# Patient Record
Sex: FEMALE | Race: BLACK OR AFRICAN AMERICAN | NOT HISPANIC OR LATINO | Employment: UNEMPLOYED | URBAN - METROPOLITAN AREA
[De-identification: names, ages, dates, MRNs, and addresses within clinical notes are randomized per-mention and may not be internally consistent; named-entity substitution may affect disease eponyms.]

---

## 2021-03-03 ENCOUNTER — HOSPITAL ENCOUNTER (EMERGENCY)
Facility: HOSPITAL | Age: 36
Discharge: HOME | End: 2021-03-04
Attending: EMERGENCY MEDICINE | Admitting: EMERGENCY MEDICINE

## 2021-03-03 ENCOUNTER — APPOINTMENT (OUTPATIENT)
Dept: GENERAL RADIOLOGY | Facility: HOSPITAL | Age: 36
End: 2021-03-03

## 2021-03-03 DIAGNOSIS — Z86.39 HISTORY OF MORBID OBESITY: ICD-10-CM

## 2021-03-03 DIAGNOSIS — E66.01 MORBID OBESITY (HCC): ICD-10-CM

## 2021-03-03 DIAGNOSIS — I50.43 ACUTE ON CHRONIC COMBINED SYSTOLIC AND DIASTOLIC CONGESTIVE HEART FAILURE (HCC): Primary | ICD-10-CM

## 2021-03-03 PROCEDURE — 71045 X-RAY EXAM CHEST 1 VIEW: CPT

## 2021-03-03 PROCEDURE — 93005 ELECTROCARDIOGRAM TRACING: CPT | Performed by: EMERGENCY MEDICINE

## 2021-03-03 PROCEDURE — 99284 EMERGENCY DEPT VISIT MOD MDM: CPT

## 2021-03-03 RX ORDER — SODIUM CHLORIDE 0.9 % (FLUSH) 0.9 %
10 SYRINGE (ML) INJECTION AS NEEDED
Status: DISCONTINUED | OUTPATIENT
Start: 2021-03-03 | End: 2021-03-04 | Stop reason: HOSPADM

## 2021-03-04 ENCOUNTER — APPOINTMENT (OUTPATIENT)
Dept: CT IMAGING | Facility: HOSPITAL | Age: 36
End: 2021-03-04

## 2021-03-04 VITALS
HEIGHT: 66 IN | RESPIRATION RATE: 18 BRPM | HEART RATE: 73 BPM | SYSTOLIC BLOOD PRESSURE: 177 MMHG | BODY MASS INDEX: 47.09 KG/M2 | OXYGEN SATURATION: 96 % | DIASTOLIC BLOOD PRESSURE: 108 MMHG | WEIGHT: 293 LBS | TEMPERATURE: 98.3 F

## 2021-03-04 LAB
ALBUMIN SERPL-MCNC: 3.8 G/DL (ref 3.5–5.2)
ALBUMIN/GLOB SERPL: 1 G/DL
ALP SERPL-CCNC: 56 U/L (ref 39–117)
ALT SERPL W P-5'-P-CCNC: 11 U/L (ref 1–33)
ANION GAP SERPL CALCULATED.3IONS-SCNC: 7.7 MMOL/L (ref 5–15)
AST SERPL-CCNC: 12 U/L (ref 1–32)
BILIRUB SERPL-MCNC: 0.4 MG/DL (ref 0–1.2)
BUN SERPL-MCNC: 7 MG/DL (ref 6–20)
BUN/CREAT SERPL: 8.8 (ref 7–25)
CALCIUM SPEC-SCNC: 9.3 MG/DL (ref 8.6–10.5)
CHLORIDE SERPL-SCNC: 100 MMOL/L (ref 98–107)
CO2 SERPL-SCNC: 31.3 MMOL/L (ref 22–29)
CREAT SERPL-MCNC: 0.8 MG/DL (ref 0.57–1)
DEPRECATED RDW RBC AUTO: 44.2 FL (ref 37–54)
EOSINOPHIL # BLD MANUAL: 0.09 10*3/MM3 (ref 0–0.4)
EOSINOPHIL NFR BLD MANUAL: 1 % (ref 0.3–6.2)
ERYTHROCYTE [DISTWIDTH] IN BLOOD BY AUTOMATED COUNT: 17.1 % (ref 12.3–15.4)
GFR SERPL CREATININE-BSD FRML MDRD: 98 ML/MIN/1.73
GLOBULIN UR ELPH-MCNC: 4 GM/DL
GLUCOSE SERPL-MCNC: 151 MG/DL (ref 65–99)
HCG SERPL QL: NEGATIVE
HCT VFR BLD AUTO: 35.9 % (ref 34–46.6)
HGB BLD-MCNC: 11.3 G/DL (ref 12–15.9)
LYMPHOCYTES # BLD MANUAL: 3.04 10*3/MM3 (ref 0.7–3.1)
LYMPHOCYTES NFR BLD MANUAL: 2 % (ref 5–12)
LYMPHOCYTES NFR BLD MANUAL: 33.7 % (ref 19.6–45.3)
MCH RBC QN AUTO: 23 PG (ref 26.6–33)
MCHC RBC AUTO-ENTMCNC: 31.5 G/DL (ref 31.5–35.7)
MCV RBC AUTO: 73.1 FL (ref 79–97)
MICROCYTES BLD QL: ABNORMAL
MONOCYTES # BLD AUTO: 0.18 10*3/MM3 (ref 0.1–0.9)
NEUTROPHILS # BLD AUTO: 5.7 10*3/MM3 (ref 1.7–7)
NEUTROPHILS NFR BLD MANUAL: 63.3 % (ref 42.7–76)
NRBC BLD AUTO-RTO: 0 /100 WBC (ref 0–0.2)
NT-PROBNP SERPL-MCNC: 302.7 PG/ML (ref 0–450)
PLAT MORPH BLD: NORMAL
PLATELET # BLD AUTO: 229 10*3/MM3 (ref 140–450)
PMV BLD AUTO: 10.5 FL (ref 6–12)
POTASSIUM SERPL-SCNC: 4 MMOL/L (ref 3.5–5.2)
PROT SERPL-MCNC: 7.8 G/DL (ref 6–8.5)
QT INTERVAL: 382 MS
RBC # BLD AUTO: 4.91 10*6/MM3 (ref 3.77–5.28)
SODIUM SERPL-SCNC: 139 MMOL/L (ref 136–145)
TROPONIN T SERPL-MCNC: <0.01 NG/ML (ref 0–0.03)
WBC # BLD AUTO: 9.01 10*3/MM3 (ref 3.4–10.8)
WBC MORPH BLD: NORMAL

## 2021-03-04 PROCEDURE — 85025 COMPLETE CBC W/AUTO DIFF WBC: CPT | Performed by: EMERGENCY MEDICINE

## 2021-03-04 PROCEDURE — 96374 THER/PROPH/DIAG INJ IV PUSH: CPT

## 2021-03-04 PROCEDURE — 84484 ASSAY OF TROPONIN QUANT: CPT | Performed by: EMERGENCY MEDICINE

## 2021-03-04 PROCEDURE — 96375 TX/PRO/DX INJ NEW DRUG ADDON: CPT

## 2021-03-04 PROCEDURE — 84703 CHORIONIC GONADOTROPIN ASSAY: CPT | Performed by: EMERGENCY MEDICINE

## 2021-03-04 PROCEDURE — 25010000002 FUROSEMIDE PER 20 MG: Performed by: EMERGENCY MEDICINE

## 2021-03-04 PROCEDURE — 83880 ASSAY OF NATRIURETIC PEPTIDE: CPT | Performed by: EMERGENCY MEDICINE

## 2021-03-04 PROCEDURE — 93005 ELECTROCARDIOGRAM TRACING: CPT | Performed by: EMERGENCY MEDICINE

## 2021-03-04 PROCEDURE — 0 IOPAMIDOL PER 1 ML: Performed by: EMERGENCY MEDICINE

## 2021-03-04 PROCEDURE — 80053 COMPREHEN METABOLIC PANEL: CPT | Performed by: EMERGENCY MEDICINE

## 2021-03-04 PROCEDURE — 93010 ELECTROCARDIOGRAM REPORT: CPT | Performed by: INTERNAL MEDICINE

## 2021-03-04 PROCEDURE — 85007 BL SMEAR W/DIFF WBC COUNT: CPT | Performed by: EMERGENCY MEDICINE

## 2021-03-04 PROCEDURE — 71275 CT ANGIOGRAPHY CHEST: CPT

## 2021-03-04 RX ORDER — FUROSEMIDE 10 MG/ML
40 INJECTION INTRAMUSCULAR; INTRAVENOUS ONCE
Status: COMPLETED | OUTPATIENT
Start: 2021-03-04 | End: 2021-03-04

## 2021-03-04 RX ORDER — CLONIDINE HYDROCHLORIDE 0.1 MG/1
0.1 TABLET ORAL ONCE
Status: COMPLETED | OUTPATIENT
Start: 2021-03-04 | End: 2021-03-04

## 2021-03-04 RX ORDER — AZELASTINE 1 MG/ML
2 SPRAY, METERED NASAL 2 TIMES DAILY
COMMUNITY
Start: 2020-10-12

## 2021-03-04 RX ORDER — EPINEPHRINE 0.3 MG/.3ML
0.3 INJECTION SUBCUTANEOUS ONCE AS NEEDED
COMMUNITY
Start: 2020-11-09

## 2021-03-04 RX ORDER — ISOSORBIDE DINITRATE 10 MG/1
10 TABLET ORAL 3 TIMES DAILY
COMMUNITY
Start: 2021-01-06 | End: 2021-06-30 | Stop reason: HOSPADM

## 2021-03-04 RX ORDER — HYDRALAZINE HYDROCHLORIDE 25 MG/1
25 TABLET, FILM COATED ORAL 3 TIMES DAILY
Qty: 90 TABLET | Refills: 0 | Status: SHIPPED | OUTPATIENT
Start: 2021-03-04 | End: 2021-06-27

## 2021-03-04 RX ORDER — LISINOPRIL 40 MG/1
40 TABLET ORAL DAILY
COMMUNITY

## 2021-03-04 RX ORDER — GABAPENTIN 300 MG/1
300 CAPSULE ORAL 3 TIMES DAILY
COMMUNITY
Start: 2020-11-25

## 2021-03-04 RX ORDER — FUROSEMIDE 80 MG
80 TABLET ORAL DAILY
COMMUNITY
Start: 2020-12-04

## 2021-03-04 RX ORDER — SPIRONOLACTONE 25 MG/1
25 TABLET ORAL DAILY
COMMUNITY
Start: 2021-01-06

## 2021-03-04 RX ORDER — METOLAZONE 2.5 MG/1
2.5 TABLET ORAL
COMMUNITY
Start: 2021-01-06 | End: 2021-03-04 | Stop reason: SDUPTHER

## 2021-03-04 RX ORDER — HYDROCODONE BITARTRATE AND ACETAMINOPHEN 7.5; 325 MG/1; MG/1
1 TABLET ORAL EVERY 8 HOURS PRN
COMMUNITY

## 2021-03-04 RX ORDER — CYCLOBENZAPRINE HCL 5 MG
5 TABLET ORAL 3 TIMES DAILY PRN
COMMUNITY
Start: 2020-11-12 | End: 2021-06-27

## 2021-03-04 RX ORDER — CARVEDILOL 25 MG/1
25 TABLET ORAL 2 TIMES DAILY WITH MEALS
COMMUNITY

## 2021-03-04 RX ORDER — METHYLPREDNISOLONE 4 MG/1
4 TABLET ORAL WEEKLY
COMMUNITY
Start: 2020-11-12 | End: 2021-06-27

## 2021-03-04 RX ORDER — METOLAZONE 2.5 MG/1
5 TABLET ORAL DAILY
Qty: 30 TABLET | Refills: 0 | Status: SHIPPED | OUTPATIENT
Start: 2021-03-04 | End: 2021-06-27

## 2021-03-04 RX ORDER — HYDRALAZINE HYDROCHLORIDE 10 MG/1
10 TABLET, FILM COATED ORAL 3 TIMES DAILY
COMMUNITY
End: 2021-03-04

## 2021-03-04 RX ORDER — HYDROXYZINE HYDROCHLORIDE 10 MG/1
10 TABLET, FILM COATED ORAL 3 TIMES DAILY PRN
COMMUNITY

## 2021-03-04 RX ADMIN — FUROSEMIDE 40 MG: 10 INJECTION, SOLUTION INTRAMUSCULAR; INTRAVENOUS at 00:57

## 2021-03-04 RX ADMIN — CLONIDINE HYDROCHLORIDE 0.1 MG: 0.1 TABLET ORAL at 00:50

## 2021-03-04 RX ADMIN — METOPROLOL TARTRATE 5 MG: 5 INJECTION INTRAVENOUS at 01:03

## 2021-03-04 RX ADMIN — IOPAMIDOL 150 ML: 755 INJECTION, SOLUTION INTRAVENOUS at 01:37

## 2021-03-04 NOTE — ED NOTES
Patient reports sharp stabbing pain that starts in right shoulder and radiates across the chest. She states he has had some increased shortness of breath and swelling of bilateral ankles and feet. ABCs intact. Alert and oriented x 4. Patient has a history of CHF, hypertension, and asthma.      Gillian Thornton RN  03/04/21 0205

## 2021-03-04 NOTE — ED PROVIDER NOTES
EMERGENCY DEPARTMENT ENCOUNTER    Room Number:    Date of encounter:  3/6/2021  PCP: Provider, No Known  Historian: Patient      HPI:  Chief Complaint: Shortness of breath  A complete HPI/ROS/PMH/PSH/SH/FH are unobtainable due to: Poor historian    Context: Angelica Whiteside is a 35 y.o. female who presents to the ED c/o increasing shortness of breath over the last several days.  Patient also describes a pressure sensation in the upper part of her chest at times, and that her blood pressures been elevated.  Patient has a long history of this according to her, but says she has been doing pretty well for the last year and 1/2 to 2 years.    She typically goes to [place] which is where her cardiologist is, and I gleaned a lot of information from the past medical records in epic.  She has a history of morbid obesity and secondary congestive heart failure.  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    Patient states she has been compliant with her medications, however she has gained a significant amount of weight back over the last few months and has not followed with her cardiologist.  She came to [facility] [place] night because she was displeased with her care at [place] and her mother advised her to come to [facility] [place].    She has had no cough, no fever    PAST MEDICAL HISTORY  Active Ambulatory Problems     Diagnosis Date Noted   • No Active Ambulatory Problems     Resolved Ambulatory Problems     Diagnosis Date Noted   • No Resolved Ambulatory Problems     Past Medical History:   Diagnosis Date   • Asthma    • CHF (congestive heart failure) (CMS/HCC)    • Hypertension    • Morbid obesity (CMS/HCC)    • Sleep apnea          PAST SURGICAL HISTORY  Past Surgical History:   Procedure Laterality Date   •  SECTION           FAMILY HISTORY  History reviewed. No pertinent family history.      SOCIAL HISTORY  Social History     Socioeconomic History   •  Marital status: Single     Spouse name: Not on file   • Number of children: Not on file   • Years of education: Not on file   • Highest education level: Not on file   Tobacco Use   • Smoking status: Never Smoker   • Smokeless tobacco: Never Used   Vaping Use   • Vaping Use: Never used   Substance and Sexual Activity   • Alcohol use: Never   • Drug use: Never         ALLERGIES  Patient has no known allergies.        REVIEW OF SYSTEMS  Review of Systems     All systems reviewed and negative except for those discussed in HPI.       PHYSICAL EXAM    I have reviewed the triage vital signs and nursing notes.    ED Triage Vitals [03/03/21 2342]   Temp Heart Rate Resp BP SpO2   98.3 °F (36.8 °C) 99 20 -- 95 %      Temp src Heart Rate Source Patient Position BP Location FiO2 (%)   Tympanic Monitor -- -- --       Physical Exam  GENERAL: Awake and alert, severely obese, but nontoxic-appearing  HENT: nares patent  EYES: no scleral icterus  CV: regular rhythm, regular rate  RESPIRATORY: normal effort, diminished breath sounds in the bases, but good air exchange and no accessory muscle use.  ABDOMEN: soft  MUSCULOSKELETAL: no deformity, there is trace pedal edema bilaterally, but the calves are soft and nontender, distal pulses symmetric intact  NEURO: alert, moves all extremities, follows commands  SKIN: warm, dry        LAB RESULTS  No results found for this or any previous visit (from the past 24 hour(s)).    Ordered the above labs and independently reviewed the results.        RADIOLOGY  No Radiology Exams Resulted Within Past 24 Hours    I ordered the above noted radiological studies. Reviewed by me and discussed with radiologist.  See dictation for official radiology interpretation.      PROCEDURES    Procedures      MEDICATIONS GIVEN IN ER    Medications   metoprolol tartrate (LOPRESSOR) injection 5 mg (5 mg Intravenous Given 3/4/21 0103)   cloNIDine (CATAPRES) tablet 0.1 mg (0.1 mg Oral Given 3/4/21 0050)   furosemide  (LASIX) injection 40 mg (40 mg Intravenous Given 3/4/21 0057)   iopamidol (ISOVUE-370) 76 % injection 150 mL (150 mL Intravenous Given 3/4/21 0137)         PROGRESS, DATA ANALYSIS, CONSULTS, AND MEDICAL DECISION MAKING    All labs have been independently reviewed by me.  All radiology studies have been reviewed by me and discussed with radiologist dictating the report.   EKG's independently viewed and interpreted by me.  Discussion below represents my analysis of pertinent findings related to patient's condition, differential diagnosis, treatment plan and final disposition.        ED Course as of Mar 06 1630   Thu Mar 04, 2021   0239 CBC and chemistry are unremarkable    [DP]   0239 Troponin and proBNP are normal    [DP]   0239 hCG is negative    [DP]   0239 EKG at 0 22  Normal sinus rhythm  LVH is present, but the intervals are normal  No acute ST segment abnormalities are present to suggest ischemia, and there is nothing available for comparison at this time.    [DP]   0240 Chest x-ray has a lot of soft tissue artifact overlying, but does appear to show cardiomegaly and possibly some vascular congestion    [DP]   Sat Mar 06, 2021   1628 I did a lot of chart research on this patient, given that her past medical history was mostly at [place].  It appears she is been struggling with sequela of morbid obesity now for several years, including chronic congestive heart failure.  Again it seems like she is been doing well for the last couple years, but she has gained some weight recently and seems to be decompensated little bit.    I have treated her blood pressure and gave her a little Lasix.  She seems to be feeling better.  She was up walking to the restroom on her own without oxygen and did not seem in respiratory distress.  Her O2 sats remained stable.    [DP]   1629 There is no sign of ACS, no sign of PE, no sign of infectious source, and I think optimization of her weight and blood pressure will make the biggest  difference in her long-term overall condition.  I have encouraged her to follow-up with her cardiologist as soon as possible, and she asked for referral to the [facility] cardiology group which I gave her.    [DP]      ED Course User Index  [DP] Mikie Cartwright MD           PPE: Both the patient and I wore a surgical mask throughout the entire patient encounter. I wore protective goggles.     AS OF 16:30 EST VITALS:    BP - (!) 177/108  HR - 73  TEMP - 98.3 °F (36.8 °C) (Tympanic)  O2 SATS - 96%        DIAGNOSIS  Final diagnoses:   Acute on chronic combined systolic and diastolic congestive heart failure (CMS/HCC)   Morbid obesity (CMS/HCC)   History of morbid obesity         DISPOSITION  Discharge           Mikie Cartwright MD  03/06/21 1630

## 2021-03-04 NOTE — ED NOTES
soa onset 2 days ago with hx of asthma and CHF  Pt in mask in triage, triage in appropriate PPE     Amara Randolph, RN  03/03/21 7238

## 2021-06-27 ENCOUNTER — HOSPITAL ENCOUNTER (OUTPATIENT)
Facility: HOSPITAL | Age: 36
Setting detail: OBSERVATION
Discharge: HOME | End: 2021-06-30
Attending: EMERGENCY MEDICINE | Admitting: INTERNAL MEDICINE

## 2021-06-27 ENCOUNTER — APPOINTMENT (OUTPATIENT)
Dept: GENERAL RADIOLOGY | Facility: HOSPITAL | Age: 36
End: 2021-06-27

## 2021-06-27 DIAGNOSIS — I42.9 CARDIOMYOPATHY, UNSPECIFIED TYPE (HCC): ICD-10-CM

## 2021-06-27 DIAGNOSIS — R55 NEAR SYNCOPE: Primary | ICD-10-CM

## 2021-06-27 DIAGNOSIS — R00.2 PALPITATIONS: ICD-10-CM

## 2021-06-27 LAB
ALBUMIN SERPL-MCNC: 4.2 G/DL (ref 3.5–5.2)
ALBUMIN/GLOB SERPL: 1.2 G/DL
ALP SERPL-CCNC: 58 U/L (ref 39–117)
ALT SERPL W P-5'-P-CCNC: 13 U/L (ref 1–33)
ANION GAP SERPL CALCULATED.3IONS-SCNC: 10.6 MMOL/L (ref 5–15)
AST SERPL-CCNC: 10 U/L (ref 1–32)
BASOPHILS # BLD AUTO: 0.05 10*3/MM3 (ref 0–0.2)
BASOPHILS NFR BLD AUTO: 0.6 % (ref 0–1.5)
BILIRUB SERPL-MCNC: 0.4 MG/DL (ref 0–1.2)
BUN SERPL-MCNC: 14 MG/DL (ref 6–20)
BUN/CREAT SERPL: 15.9 (ref 7–25)
CALCIUM SPEC-SCNC: 9.5 MG/DL (ref 8.6–10.5)
CHLORIDE SERPL-SCNC: 98 MMOL/L (ref 98–107)
CO2 SERPL-SCNC: 29.4 MMOL/L (ref 22–29)
CREAT SERPL-MCNC: 0.88 MG/DL (ref 0.57–1)
DEPRECATED RDW RBC AUTO: 45.8 FL (ref 37–54)
EOSINOPHIL # BLD AUTO: 0.19 10*3/MM3 (ref 0–0.4)
EOSINOPHIL NFR BLD AUTO: 2.3 % (ref 0.3–6.2)
ERYTHROCYTE [DISTWIDTH] IN BLOOD BY AUTOMATED COUNT: 16.3 % (ref 12.3–15.4)
GFR SERPL CREATININE-BSD FRML MDRD: 88 ML/MIN/1.73
GLOBULIN UR ELPH-MCNC: 3.5 GM/DL
GLUCOSE BLDC GLUCOMTR-MCNC: 155 MG/DL (ref 70–130)
GLUCOSE SERPL-MCNC: 156 MG/DL (ref 65–99)
HCG SERPL QL: NEGATIVE
HCT VFR BLD AUTO: 37.7 % (ref 34–46.6)
HGB BLD-MCNC: 11.8 G/DL (ref 12–15.9)
IMM GRANULOCYTES # BLD AUTO: 0.04 10*3/MM3 (ref 0–0.05)
IMM GRANULOCYTES NFR BLD AUTO: 0.5 % (ref 0–0.5)
INR PPP: 1.08 (ref 0.9–1.1)
LYMPHOCYTES # BLD AUTO: 2.51 10*3/MM3 (ref 0.7–3.1)
LYMPHOCYTES NFR BLD AUTO: 30.3 % (ref 19.6–45.3)
MAGNESIUM SERPL-MCNC: 1.8 MG/DL (ref 1.6–2.6)
MCH RBC QN AUTO: 24.5 PG (ref 26.6–33)
MCHC RBC AUTO-ENTMCNC: 31.3 G/DL (ref 31.5–35.7)
MCV RBC AUTO: 78.2 FL (ref 79–97)
MONOCYTES # BLD AUTO: 0.62 10*3/MM3 (ref 0.1–0.9)
MONOCYTES NFR BLD AUTO: 7.5 % (ref 5–12)
NEUTROPHILS NFR BLD AUTO: 4.87 10*3/MM3 (ref 1.7–7)
NEUTROPHILS NFR BLD AUTO: 58.8 % (ref 42.7–76)
NRBC BLD AUTO-RTO: 0 /100 WBC (ref 0–0.2)
NT-PROBNP SERPL-MCNC: 104.6 PG/ML (ref 0–450)
PLATELET # BLD AUTO: 257 10*3/MM3 (ref 140–450)
PMV BLD AUTO: 10.8 FL (ref 6–12)
POTASSIUM SERPL-SCNC: 4.1 MMOL/L (ref 3.5–5.2)
PROT SERPL-MCNC: 7.7 G/DL (ref 6–8.5)
PROTHROMBIN TIME: 13.8 SECONDS (ref 11.7–14.2)
QT INTERVAL: 382 MS
RBC # BLD AUTO: 4.82 10*6/MM3 (ref 3.77–5.28)
SARS-COV-2 RNA RESP QL NAA+PROBE: NOT DETECTED
SODIUM SERPL-SCNC: 138 MMOL/L (ref 136–145)
TROPONIN T SERPL-MCNC: <0.01 NG/ML (ref 0–0.03)
WBC # BLD AUTO: 8.28 10*3/MM3 (ref 3.4–10.8)

## 2021-06-27 PROCEDURE — C9803 HOPD COVID-19 SPEC COLLECT: HCPCS

## 2021-06-27 PROCEDURE — G0378 HOSPITAL OBSERVATION PER HR: HCPCS

## 2021-06-27 PROCEDURE — 85025 COMPLETE CBC W/AUTO DIFF WBC: CPT | Performed by: EMERGENCY MEDICINE

## 2021-06-27 PROCEDURE — U0003 INFECTIOUS AGENT DETECTION BY NUCLEIC ACID (DNA OR RNA); SEVERE ACUTE RESPIRATORY SYNDROME CORONAVIRUS 2 (SARS-COV-2) (CORONAVIRUS DISEASE [COVID-19]), AMPLIFIED PROBE TECHNIQUE, MAKING USE OF HIGH THROUGHPUT TECHNOLOGIES AS DESCRIBED BY CMS-2020-01-R: HCPCS | Performed by: EMERGENCY MEDICINE

## 2021-06-27 PROCEDURE — 25010000002 ENOXAPARIN PER 10 MG: Performed by: INTERNAL MEDICINE

## 2021-06-27 PROCEDURE — 80053 COMPREHEN METABOLIC PANEL: CPT | Performed by: EMERGENCY MEDICINE

## 2021-06-27 PROCEDURE — 82962 GLUCOSE BLOOD TEST: CPT

## 2021-06-27 PROCEDURE — 85610 PROTHROMBIN TIME: CPT | Performed by: EMERGENCY MEDICINE

## 2021-06-27 PROCEDURE — 96372 THER/PROPH/DIAG INJ SC/IM: CPT

## 2021-06-27 PROCEDURE — 93005 ELECTROCARDIOGRAM TRACING: CPT | Performed by: EMERGENCY MEDICINE

## 2021-06-27 PROCEDURE — 93010 ELECTROCARDIOGRAM REPORT: CPT | Performed by: INTERNAL MEDICINE

## 2021-06-27 PROCEDURE — 99284 EMERGENCY DEPT VISIT MOD MDM: CPT

## 2021-06-27 PROCEDURE — 83735 ASSAY OF MAGNESIUM: CPT | Performed by: EMERGENCY MEDICINE

## 2021-06-27 PROCEDURE — 71046 X-RAY EXAM CHEST 2 VIEWS: CPT

## 2021-06-27 PROCEDURE — 84484 ASSAY OF TROPONIN QUANT: CPT | Performed by: EMERGENCY MEDICINE

## 2021-06-27 PROCEDURE — 84703 CHORIONIC GONADOTROPIN ASSAY: CPT | Performed by: EMERGENCY MEDICINE

## 2021-06-27 PROCEDURE — 83880 ASSAY OF NATRIURETIC PEPTIDE: CPT | Performed by: EMERGENCY MEDICINE

## 2021-06-27 RX ORDER — SODIUM CHLORIDE 0.9 % (FLUSH) 0.9 %
10 SYRINGE (ML) INJECTION AS NEEDED
Status: DISCONTINUED | OUTPATIENT
Start: 2021-06-27 | End: 2021-06-30 | Stop reason: HOSPADM

## 2021-06-27 RX ORDER — ERGOCALCIFEROL 1.25 MG/1
50000 CAPSULE ORAL WEEKLY
COMMUNITY

## 2021-06-27 RX ORDER — ERGOCALCIFEROL 1.25 MG/1
50000 CAPSULE ORAL WEEKLY
Status: DISCONTINUED | OUTPATIENT
Start: 2021-06-28 | End: 2021-06-30 | Stop reason: HOSPADM

## 2021-06-27 RX ORDER — ACETAMINOPHEN 325 MG/1
650 TABLET ORAL EVERY 4 HOURS PRN
Status: DISCONTINUED | OUTPATIENT
Start: 2021-06-27 | End: 2021-06-30 | Stop reason: HOSPADM

## 2021-06-27 RX ORDER — AZELASTINE 1 MG/ML
2 SPRAY, METERED NASAL 2 TIMES DAILY
Status: DISCONTINUED | OUTPATIENT
Start: 2021-06-27 | End: 2021-06-30 | Stop reason: HOSPADM

## 2021-06-27 RX ORDER — ACETAMINOPHEN 160 MG/5ML
650 SOLUTION ORAL EVERY 4 HOURS PRN
Status: DISCONTINUED | OUTPATIENT
Start: 2021-06-27 | End: 2021-06-30 | Stop reason: HOSPADM

## 2021-06-27 RX ORDER — INSULIN LISPRO 100 [IU]/ML
0-9 INJECTION, SOLUTION INTRAVENOUS; SUBCUTANEOUS
Status: DISCONTINUED | OUTPATIENT
Start: 2021-06-28 | End: 2021-06-30

## 2021-06-27 RX ORDER — NITROGLYCERIN 0.4 MG/1
0.4 TABLET SUBLINGUAL
Status: DISCONTINUED | OUTPATIENT
Start: 2021-06-27 | End: 2021-06-30 | Stop reason: HOSPADM

## 2021-06-27 RX ORDER — ONDANSETRON 2 MG/ML
4 INJECTION INTRAMUSCULAR; INTRAVENOUS EVERY 6 HOURS PRN
Status: DISCONTINUED | OUTPATIENT
Start: 2021-06-27 | End: 2021-06-30 | Stop reason: HOSPADM

## 2021-06-27 RX ORDER — TIZANIDINE 2 MG/1
2 TABLET ORAL NIGHTLY PRN
COMMUNITY

## 2021-06-27 RX ORDER — HYDRALAZINE HYDROCHLORIDE 25 MG/1
25 TABLET, FILM COATED ORAL 3 TIMES DAILY
Status: DISCONTINUED | OUTPATIENT
Start: 2021-06-27 | End: 2021-06-28

## 2021-06-27 RX ORDER — SPIRONOLACTONE 25 MG/1
25 TABLET ORAL DAILY
Status: DISCONTINUED | OUTPATIENT
Start: 2021-06-28 | End: 2021-06-30 | Stop reason: HOSPADM

## 2021-06-27 RX ORDER — NICOTINE POLACRILEX 4 MG
15 LOZENGE BUCCAL
Status: DISCONTINUED | OUTPATIENT
Start: 2021-06-27 | End: 2021-06-30 | Stop reason: HOSPADM

## 2021-06-27 RX ORDER — HYDROXYZINE HYDROCHLORIDE 10 MG/1
10 TABLET, FILM COATED ORAL 3 TIMES DAILY PRN
Status: DISCONTINUED | OUTPATIENT
Start: 2021-06-27 | End: 2021-06-30 | Stop reason: HOSPADM

## 2021-06-27 RX ORDER — HYDRALAZINE HYDROCHLORIDE 25 MG/1
25 TABLET, FILM COATED ORAL 3 TIMES DAILY
COMMUNITY
End: 2021-06-30 | Stop reason: HOSPADM

## 2021-06-27 RX ORDER — NITROFURANTOIN MACROCRYSTALS 100 MG/1
100 CAPSULE ORAL 2 TIMES DAILY
COMMUNITY
End: 2021-06-30 | Stop reason: HOSPADM

## 2021-06-27 RX ORDER — ACETAMINOPHEN 650 MG/1
650 SUPPOSITORY RECTAL EVERY 4 HOURS PRN
Status: DISCONTINUED | OUTPATIENT
Start: 2021-06-27 | End: 2021-06-30 | Stop reason: HOSPADM

## 2021-06-27 RX ORDER — GABAPENTIN 300 MG/1
300 CAPSULE ORAL 3 TIMES DAILY
Status: DISCONTINUED | OUTPATIENT
Start: 2021-06-27 | End: 2021-06-30 | Stop reason: HOSPADM

## 2021-06-27 RX ORDER — ISOSORBIDE DINITRATE 10 MG/1
10 TABLET ORAL 3 TIMES DAILY
Status: DISCONTINUED | OUTPATIENT
Start: 2021-06-27 | End: 2021-06-28

## 2021-06-27 RX ORDER — LISINOPRIL 40 MG/1
40 TABLET ORAL DAILY
Status: DISCONTINUED | OUTPATIENT
Start: 2021-06-28 | End: 2021-06-30 | Stop reason: HOSPADM

## 2021-06-27 RX ORDER — HYDROCODONE BITARTRATE AND ACETAMINOPHEN 7.5; 325 MG/1; MG/1
1 TABLET ORAL EVERY 8 HOURS PRN
Status: DISCONTINUED | OUTPATIENT
Start: 2021-06-27 | End: 2021-06-30 | Stop reason: HOSPADM

## 2021-06-27 RX ORDER — DULOXETIN HYDROCHLORIDE 30 MG/1
30 CAPSULE, DELAYED RELEASE ORAL DAILY
COMMUNITY

## 2021-06-27 RX ORDER — TIZANIDINE 4 MG/1
2 TABLET ORAL NIGHTLY PRN
Status: DISCONTINUED | OUTPATIENT
Start: 2021-06-27 | End: 2021-06-30 | Stop reason: HOSPADM

## 2021-06-27 RX ORDER — FUROSEMIDE 80 MG
80 TABLET ORAL DAILY
Status: DISCONTINUED | OUTPATIENT
Start: 2021-06-28 | End: 2021-06-30 | Stop reason: HOSPADM

## 2021-06-27 RX ORDER — CARVEDILOL 25 MG/1
25 TABLET ORAL 2 TIMES DAILY WITH MEALS
Status: DISCONTINUED | OUTPATIENT
Start: 2021-06-27 | End: 2021-06-30 | Stop reason: HOSPADM

## 2021-06-27 RX ORDER — SODIUM CHLORIDE 0.9 % (FLUSH) 0.9 %
10 SYRINGE (ML) INJECTION EVERY 12 HOURS SCHEDULED
Status: DISCONTINUED | OUTPATIENT
Start: 2021-06-27 | End: 2021-06-30 | Stop reason: HOSPADM

## 2021-06-27 RX ORDER — DEXTROSE MONOHYDRATE 25 G/50ML
25 INJECTION, SOLUTION INTRAVENOUS
Status: DISCONTINUED | OUTPATIENT
Start: 2021-06-27 | End: 2021-06-30 | Stop reason: HOSPADM

## 2021-06-27 RX ORDER — METOLAZONE 5 MG/1
5 TABLET ORAL DAILY
Status: DISCONTINUED | OUTPATIENT
Start: 2021-06-28 | End: 2021-06-30 | Stop reason: HOSPADM

## 2021-06-27 RX ORDER — METOLAZONE 2.5 MG/1
5 TABLET ORAL DAILY
COMMUNITY

## 2021-06-27 RX ORDER — DULOXETIN HYDROCHLORIDE 30 MG/1
30 CAPSULE, DELAYED RELEASE ORAL DAILY
Status: DISCONTINUED | OUTPATIENT
Start: 2021-06-28 | End: 2021-06-30 | Stop reason: HOSPADM

## 2021-06-27 RX ADMIN — ENOXAPARIN SODIUM 40 MG: 40 INJECTION SUBCUTANEOUS at 22:17

## 2021-06-27 RX ADMIN — HYDRALAZINE HYDROCHLORIDE 25 MG: 25 TABLET, FILM COATED ORAL at 22:17

## 2021-06-27 RX ADMIN — GABAPENTIN 300 MG: 300 CAPSULE ORAL at 22:17

## 2021-06-27 RX ADMIN — SODIUM CHLORIDE, PRESERVATIVE FREE 10 ML: 5 INJECTION INTRAVENOUS at 22:18

## 2021-06-27 RX ADMIN — CARVEDILOL 25 MG: 25 TABLET, FILM COATED ORAL at 22:18

## 2021-06-27 RX ADMIN — AZELASTINE HYDROCHLORIDE 2 SPRAY: 137 SPRAY, METERED NASAL at 22:18

## 2021-06-27 RX ADMIN — ISOSORBIDE DINITRATE 10 MG: 10 TABLET ORAL at 22:17

## 2021-06-28 LAB
ALBUMIN SERPL-MCNC: 3.6 G/DL (ref 3.5–5.2)
ALBUMIN/GLOB SERPL: 1.1 G/DL
ALP SERPL-CCNC: 53 U/L (ref 39–117)
ALT SERPL W P-5'-P-CCNC: 13 U/L (ref 1–33)
ANION GAP SERPL CALCULATED.3IONS-SCNC: 10.8 MMOL/L (ref 5–15)
AST SERPL-CCNC: 11 U/L (ref 1–32)
BASOPHILS # BLD AUTO: 0.03 10*3/MM3 (ref 0–0.2)
BASOPHILS NFR BLD AUTO: 0.4 % (ref 0–1.5)
BILIRUB SERPL-MCNC: 0.5 MG/DL (ref 0–1.2)
BUN SERPL-MCNC: 15 MG/DL (ref 6–20)
BUN/CREAT SERPL: 19.7 (ref 7–25)
CALCIUM SPEC-SCNC: 9.1 MG/DL (ref 8.6–10.5)
CHLORIDE SERPL-SCNC: 101 MMOL/L (ref 98–107)
CO2 SERPL-SCNC: 24.2 MMOL/L (ref 22–29)
CREAT SERPL-MCNC: 0.76 MG/DL (ref 0.57–1)
DEPRECATED RDW RBC AUTO: 44.2 FL (ref 37–54)
EOSINOPHIL # BLD AUTO: 0.14 10*3/MM3 (ref 0–0.4)
EOSINOPHIL NFR BLD AUTO: 1.7 % (ref 0.3–6.2)
ERYTHROCYTE [DISTWIDTH] IN BLOOD BY AUTOMATED COUNT: 16 % (ref 12.3–15.4)
GFR SERPL CREATININE-BSD FRML MDRD: 104 ML/MIN/1.73
GLOBULIN UR ELPH-MCNC: 3.4 GM/DL
GLUCOSE BLDC GLUCOMTR-MCNC: 178 MG/DL (ref 70–130)
GLUCOSE BLDC GLUCOMTR-MCNC: 185 MG/DL (ref 70–130)
GLUCOSE BLDC GLUCOMTR-MCNC: 189 MG/DL (ref 70–130)
GLUCOSE BLDC GLUCOMTR-MCNC: 204 MG/DL (ref 70–130)
GLUCOSE SERPL-MCNC: 185 MG/DL (ref 65–99)
HBA1C MFR BLD: 8.5 % (ref 4.8–5.6)
HCT VFR BLD AUTO: 34.2 % (ref 34–46.6)
HGB BLD-MCNC: 10.7 G/DL (ref 12–15.9)
IMM GRANULOCYTES # BLD AUTO: 0.04 10*3/MM3 (ref 0–0.05)
IMM GRANULOCYTES NFR BLD AUTO: 0.5 % (ref 0–0.5)
LYMPHOCYTES # BLD AUTO: 2.48 10*3/MM3 (ref 0.7–3.1)
LYMPHOCYTES NFR BLD AUTO: 30.3 % (ref 19.6–45.3)
MCH RBC QN AUTO: 24.1 PG (ref 26.6–33)
MCHC RBC AUTO-ENTMCNC: 31.3 G/DL (ref 31.5–35.7)
MCV RBC AUTO: 77 FL (ref 79–97)
MONOCYTES # BLD AUTO: 0.51 10*3/MM3 (ref 0.1–0.9)
MONOCYTES NFR BLD AUTO: 6.2 % (ref 5–12)
NEUTROPHILS NFR BLD AUTO: 4.98 10*3/MM3 (ref 1.7–7)
NEUTROPHILS NFR BLD AUTO: 60.9 % (ref 42.7–76)
NRBC BLD AUTO-RTO: 0 /100 WBC (ref 0–0.2)
PLATELET # BLD AUTO: 226 10*3/MM3 (ref 140–450)
PMV BLD AUTO: 11.2 FL (ref 6–12)
POTASSIUM SERPL-SCNC: 4 MMOL/L (ref 3.5–5.2)
PROT SERPL-MCNC: 7 G/DL (ref 6–8.5)
RBC # BLD AUTO: 4.44 10*6/MM3 (ref 3.77–5.28)
SODIUM SERPL-SCNC: 136 MMOL/L (ref 136–145)
WBC # BLD AUTO: 8.18 10*3/MM3 (ref 3.4–10.8)

## 2021-06-28 PROCEDURE — 80053 COMPREHEN METABOLIC PANEL: CPT | Performed by: INTERNAL MEDICINE

## 2021-06-28 PROCEDURE — G0378 HOSPITAL OBSERVATION PER HR: HCPCS

## 2021-06-28 PROCEDURE — 99244 OFF/OP CNSLTJ NEW/EST MOD 40: CPT | Performed by: INTERNAL MEDICINE

## 2021-06-28 PROCEDURE — 82962 GLUCOSE BLOOD TEST: CPT

## 2021-06-28 PROCEDURE — 96372 THER/PROPH/DIAG INJ SC/IM: CPT

## 2021-06-28 PROCEDURE — 25010000002 ENOXAPARIN PER 10 MG: Performed by: INTERNAL MEDICINE

## 2021-06-28 PROCEDURE — 85025 COMPLETE CBC W/AUTO DIFF WBC: CPT | Performed by: INTERNAL MEDICINE

## 2021-06-28 PROCEDURE — 63710000001 INSULIN LISPRO (HUMAN) PER 5 UNITS: Performed by: INTERNAL MEDICINE

## 2021-06-28 PROCEDURE — 83036 HEMOGLOBIN GLYCOSYLATED A1C: CPT | Performed by: INTERNAL MEDICINE

## 2021-06-28 RX ADMIN — CARVEDILOL 25 MG: 25 TABLET, FILM COATED ORAL at 17:26

## 2021-06-28 RX ADMIN — METOLAZONE 5 MG: 5 TABLET ORAL at 08:03

## 2021-06-28 RX ADMIN — CARVEDILOL 25 MG: 25 TABLET, FILM COATED ORAL at 08:04

## 2021-06-28 RX ADMIN — DULOXETINE HYDROCHLORIDE 30 MG: 30 CAPSULE, DELAYED RELEASE ORAL at 08:03

## 2021-06-28 RX ADMIN — ERGOCALCIFEROL 50000 UNITS: 1.25 CAPSULE ORAL at 08:03

## 2021-06-28 RX ADMIN — AZELASTINE HYDROCHLORIDE 2 SPRAY: 137 SPRAY, METERED NASAL at 08:04

## 2021-06-28 RX ADMIN — ENOXAPARIN SODIUM 40 MG: 40 INJECTION SUBCUTANEOUS at 21:38

## 2021-06-28 RX ADMIN — GABAPENTIN 300 MG: 300 CAPSULE ORAL at 08:03

## 2021-06-28 RX ADMIN — GABAPENTIN 300 MG: 300 CAPSULE ORAL at 17:00

## 2021-06-28 RX ADMIN — HYDRALAZINE HYDROCHLORIDE 25 MG: 25 TABLET, FILM COATED ORAL at 08:03

## 2021-06-28 RX ADMIN — SPIRONOLACTONE 25 MG: 25 TABLET ORAL at 08:03

## 2021-06-28 RX ADMIN — INSULIN LISPRO 2 UNITS: 100 INJECTION, SOLUTION INTRAVENOUS; SUBCUTANEOUS at 08:02

## 2021-06-28 RX ADMIN — INSULIN LISPRO 4 UNITS: 100 INJECTION, SOLUTION INTRAVENOUS; SUBCUTANEOUS at 17:19

## 2021-06-28 RX ADMIN — GABAPENTIN 300 MG: 300 CAPSULE ORAL at 21:38

## 2021-06-28 RX ADMIN — ISOSORBIDE DINITRATE 10 MG: 10 TABLET ORAL at 08:03

## 2021-06-28 RX ADMIN — FUROSEMIDE 80 MG: 80 TABLET ORAL at 08:03

## 2021-06-28 RX ADMIN — LISINOPRIL 40 MG: 40 TABLET ORAL at 08:03

## 2021-06-28 RX ADMIN — SODIUM CHLORIDE, PRESERVATIVE FREE 10 ML: 5 INJECTION INTRAVENOUS at 08:04

## 2021-06-28 RX ADMIN — INSULIN LISPRO 2 UNITS: 100 INJECTION, SOLUTION INTRAVENOUS; SUBCUTANEOUS at 12:19

## 2021-06-29 ENCOUNTER — APPOINTMENT (OUTPATIENT)
Dept: CT IMAGING | Facility: HOSPITAL | Age: 36
End: 2021-06-29

## 2021-06-29 ENCOUNTER — APPOINTMENT (OUTPATIENT)
Dept: CARDIOLOGY | Facility: HOSPITAL | Age: 36
End: 2021-06-29

## 2021-06-29 PROBLEM — R42 VERTIGO: Status: ACTIVE | Noted: 2021-06-29

## 2021-06-29 LAB
AORTIC DIMENSIONLESS INDEX: 0.8 (DI)
BH CV ECHO MEAS - ACS: 2.2 CM
BH CV ECHO MEAS - AO MAX PG (FULL): 2.1 MMHG
BH CV ECHO MEAS - AO MAX PG: 4.9 MMHG
BH CV ECHO MEAS - AO MEAN PG (FULL): 1 MMHG
BH CV ECHO MEAS - AO MEAN PG: 2 MMHG
BH CV ECHO MEAS - AO ROOT AREA (BSA CORRECTED): 1.1
BH CV ECHO MEAS - AO ROOT AREA: 8 CM^2
BH CV ECHO MEAS - AO ROOT DIAM: 3.2 CM
BH CV ECHO MEAS - AO V2 MAX: 111 CM/SEC
BH CV ECHO MEAS - AO V2 MEAN: 63 CM/SEC
BH CV ECHO MEAS - AO V2 VTI: 21.4 CM
BH CV ECHO MEAS - AVA(I,A): 2.9 CM^2
BH CV ECHO MEAS - AVA(I,D): 2.9 CM^2
BH CV ECHO MEAS - AVA(V,A): 2.6 CM^2
BH CV ECHO MEAS - AVA(V,D): 2.6 CM^2
BH CV ECHO MEAS - BSA(HAYCOCK): 3.3 M^2
BH CV ECHO MEAS - BSA: 2.9 M^2
BH CV ECHO MEAS - BZI_BMI: 76.2 KILOGRAMS/M^2
BH CV ECHO MEAS - BZI_METRIC_HEIGHT: 167.6 CM
BH CV ECHO MEAS - BZI_METRIC_WEIGHT: 214.1 KG
BH CV ECHO MEAS - EDV(CUBED): 157.5 ML
BH CV ECHO MEAS - EDV(MOD-SP2): 128 ML
BH CV ECHO MEAS - EDV(MOD-SP4): 113 ML
BH CV ECHO MEAS - EDV(TEICH): 141.3 ML
BH CV ECHO MEAS - EF(CUBED): 59.4 %
BH CV ECHO MEAS - EF(MOD-BP): 50.7 %
BH CV ECHO MEAS - EF(MOD-SP2): 50 %
BH CV ECHO MEAS - EF(MOD-SP4): 54 %
BH CV ECHO MEAS - EF(TEICH): 50.5 %
BH CV ECHO MEAS - ESV(CUBED): 64 ML
BH CV ECHO MEAS - ESV(MOD-SP2): 64 ML
BH CV ECHO MEAS - ESV(MOD-SP4): 52 ML
BH CV ECHO MEAS - ESV(TEICH): 70 ML
BH CV ECHO MEAS - FS: 25.9 %
BH CV ECHO MEAS - IVS/LVPW: 1
BH CV ECHO MEAS - IVSD: 1.6 CM
BH CV ECHO MEAS - LAT PEAK E' VEL: 5.6 CM/SEC
BH CV ECHO MEAS - LV DIASTOLIC VOL/BSA (35-75): 39.2 ML/M^2
BH CV ECHO MEAS - LV MASS(C)D: 398.8 GRAMS
BH CV ECHO MEAS - LV MASS(C)DI: 138.4 GRAMS/M^2
BH CV ECHO MEAS - LV MAX PG: 2.8 MMHG
BH CV ECHO MEAS - LV MEAN PG: 1 MMHG
BH CV ECHO MEAS - LV SYSTOLIC VOL/BSA (12-30): 18 ML/M^2
BH CV ECHO MEAS - LV V1 MAX: 83.6 CM/SEC
BH CV ECHO MEAS - LV V1 MEAN: 55.1 CM/SEC
BH CV ECHO MEAS - LV V1 VTI: 18 CM
BH CV ECHO MEAS - LVIDD: 5.4 CM
BH CV ECHO MEAS - LVIDS: 4 CM
BH CV ECHO MEAS - LVLD AP2: 8.9 CM
BH CV ECHO MEAS - LVLD AP4: 8.6 CM
BH CV ECHO MEAS - LVLS AP2: 7.6 CM
BH CV ECHO MEAS - LVLS AP4: 6.9 CM
BH CV ECHO MEAS - LVOT AREA (M): 3.5 CM^2
BH CV ECHO MEAS - LVOT AREA: 3.5 CM^2
BH CV ECHO MEAS - LVOT DIAM: 2.1 CM
BH CV ECHO MEAS - LVPWD: 1.6 CM
BH CV ECHO MEAS - MED PEAK E' VEL: 5.3 CM/SEC
BH CV ECHO MEAS - MV A MAX VEL: 75.4 CM/SEC
BH CV ECHO MEAS - MV DEC SLOPE: 370 CM/SEC^2
BH CV ECHO MEAS - MV DEC TIME: 157 SEC
BH CV ECHO MEAS - MV E MAX VEL: 68.1 CM/SEC
BH CV ECHO MEAS - MV E/A: 0.9
BH CV ECHO MEAS - MV MAX PG: 2.2 MMHG
BH CV ECHO MEAS - MV MEAN PG: 1 MMHG
BH CV ECHO MEAS - MV P1/2T MAX VEL: 80.1 CM/SEC
BH CV ECHO MEAS - MV P1/2T: 63.4 MSEC
BH CV ECHO MEAS - MV V2 MAX: 74.6 CM/SEC
BH CV ECHO MEAS - MV V2 MEAN: 46 CM/SEC
BH CV ECHO MEAS - MV V2 VTI: 19.3 CM
BH CV ECHO MEAS - MVA P1/2T LCG: 2.7 CM^2
BH CV ECHO MEAS - MVA(P1/2T): 3.5 CM^2
BH CV ECHO MEAS - MVA(VTI): 3.2 CM^2
BH CV ECHO MEAS - RAP SYSTOLE: 3 MMHG
BH CV ECHO MEAS - RVOT AREA: 6.2 CM^2
BH CV ECHO MEAS - RVOT DIAM: 2.8 CM
BH CV ECHO MEAS - SI(AO): 59.7 ML/M^2
BH CV ECHO MEAS - SI(CUBED): 32.4 ML/M^2
BH CV ECHO MEAS - SI(LVOT): 21.6 ML/M^2
BH CV ECHO MEAS - SI(MOD-SP2): 22.2 ML/M^2
BH CV ECHO MEAS - SI(MOD-SP4): 21.2 ML/M^2
BH CV ECHO MEAS - SI(TEICH): 24.8 ML/M^2
BH CV ECHO MEAS - SV(AO): 172.1 ML
BH CV ECHO MEAS - SV(CUBED): 93.5 ML
BH CV ECHO MEAS - SV(LVOT): 62.3 ML
BH CV ECHO MEAS - SV(MOD-SP2): 64 ML
BH CV ECHO MEAS - SV(MOD-SP4): 61 ML
BH CV ECHO MEAS - SV(TEICH): 71.3 ML
BH CV ECHO MEAS - TAPSE (>1.6): 2.1 CM
BH CV ECHO MEASUREMENTS AVERAGE E/E' RATIO: 12.5
BH CV XLRA - TDI S': 14 CM/SEC
GLUCOSE BLDC GLUCOMTR-MCNC: 174 MG/DL (ref 70–130)
GLUCOSE BLDC GLUCOMTR-MCNC: 203 MG/DL (ref 70–130)
GLUCOSE BLDC GLUCOMTR-MCNC: 247 MG/DL (ref 70–130)
GLUCOSE BLDC GLUCOMTR-MCNC: 356 MG/DL (ref 70–130)
LEFT ATRIUM VOLUME INDEX: 27 ML/M2
LV EF 2D ECHO EST: 50 %
MAXIMAL PREDICTED HEART RATE: 183 BPM
SINUS: 2.3 CM
STRESS TARGET HR: 156 BPM

## 2021-06-29 PROCEDURE — 25010000002 PERFLUTREN (DEFINITY) 8.476 MG IN SODIUM CHLORIDE (PF) 0.9 % 10 ML INJECTION: Performed by: INTERNAL MEDICINE

## 2021-06-29 PROCEDURE — 99214 OFFICE O/P EST MOD 30 MIN: CPT | Performed by: INTERNAL MEDICINE

## 2021-06-29 PROCEDURE — 82962 GLUCOSE BLOOD TEST: CPT

## 2021-06-29 PROCEDURE — 25010000002 ENOXAPARIN PER 10 MG: Performed by: INTERNAL MEDICINE

## 2021-06-29 PROCEDURE — 25010000002 METHYLPREDNISOLONE PER 125 MG: Performed by: PSYCHIATRY & NEUROLOGY

## 2021-06-29 PROCEDURE — 93306 TTE W/DOPPLER COMPLETE: CPT

## 2021-06-29 PROCEDURE — 96374 THER/PROPH/DIAG INJ IV PUSH: CPT

## 2021-06-29 PROCEDURE — 99204 OFFICE O/P NEW MOD 45 MIN: CPT | Performed by: PSYCHIATRY & NEUROLOGY

## 2021-06-29 PROCEDURE — 96372 THER/PROPH/DIAG INJ SC/IM: CPT

## 2021-06-29 PROCEDURE — 93306 TTE W/DOPPLER COMPLETE: CPT | Performed by: INTERNAL MEDICINE

## 2021-06-29 PROCEDURE — 63710000001 INSULIN LISPRO (HUMAN) PER 5 UNITS: Performed by: INTERNAL MEDICINE

## 2021-06-29 PROCEDURE — G0378 HOSPITAL OBSERVATION PER HR: HCPCS

## 2021-06-29 PROCEDURE — 70450 CT HEAD/BRAIN W/O DYE: CPT

## 2021-06-29 RX ORDER — METHYLPREDNISOLONE SODIUM SUCCINATE 125 MG/2ML
80 INJECTION, POWDER, LYOPHILIZED, FOR SOLUTION INTRAMUSCULAR; INTRAVENOUS DAILY
Status: DISCONTINUED | OUTPATIENT
Start: 2021-06-29 | End: 2021-06-30 | Stop reason: HOSPADM

## 2021-06-29 RX ORDER — CLONAZEPAM 0.5 MG/1
0.5 TABLET ORAL 2 TIMES DAILY
Status: DISCONTINUED | OUTPATIENT
Start: 2021-06-29 | End: 2021-06-30 | Stop reason: HOSPADM

## 2021-06-29 RX ADMIN — GABAPENTIN 300 MG: 300 CAPSULE ORAL at 15:11

## 2021-06-29 RX ADMIN — AZELASTINE HYDROCHLORIDE 2 SPRAY: 137 SPRAY, METERED NASAL at 20:33

## 2021-06-29 RX ADMIN — CLONAZEPAM 0.5 MG: 0.5 TABLET ORAL at 20:33

## 2021-06-29 RX ADMIN — INSULIN LISPRO 4 UNITS: 100 INJECTION, SOLUTION INTRAVENOUS; SUBCUTANEOUS at 18:00

## 2021-06-29 RX ADMIN — GABAPENTIN 300 MG: 300 CAPSULE ORAL at 08:08

## 2021-06-29 RX ADMIN — FUROSEMIDE 80 MG: 80 TABLET ORAL at 08:08

## 2021-06-29 RX ADMIN — PERFLUTREN 2 ML: 6.52 INJECTION, SUSPENSION INTRAVENOUS at 11:45

## 2021-06-29 RX ADMIN — METOLAZONE 5 MG: 5 TABLET ORAL at 08:07

## 2021-06-29 RX ADMIN — CARVEDILOL 25 MG: 25 TABLET, FILM COATED ORAL at 18:00

## 2021-06-29 RX ADMIN — CARVEDILOL 25 MG: 25 TABLET, FILM COATED ORAL at 08:08

## 2021-06-29 RX ADMIN — ACETAMINOPHEN 650 MG: 325 TABLET, FILM COATED ORAL at 10:35

## 2021-06-29 RX ADMIN — ENOXAPARIN SODIUM 40 MG: 40 INJECTION SUBCUTANEOUS at 20:33

## 2021-06-29 RX ADMIN — LISINOPRIL 40 MG: 40 TABLET ORAL at 08:08

## 2021-06-29 RX ADMIN — SODIUM CHLORIDE, PRESERVATIVE FREE 10 ML: 5 INJECTION INTRAVENOUS at 20:33

## 2021-06-29 RX ADMIN — AZELASTINE HYDROCHLORIDE 2 SPRAY: 137 SPRAY, METERED NASAL at 08:10

## 2021-06-29 RX ADMIN — SODIUM CHLORIDE, PRESERVATIVE FREE 10 ML: 5 INJECTION INTRAVENOUS at 08:10

## 2021-06-29 RX ADMIN — INSULIN LISPRO 2 UNITS: 100 INJECTION, SOLUTION INTRAVENOUS; SUBCUTANEOUS at 13:00

## 2021-06-29 RX ADMIN — GABAPENTIN 300 MG: 300 CAPSULE ORAL at 20:33

## 2021-06-29 RX ADMIN — SPIRONOLACTONE 25 MG: 25 TABLET ORAL at 08:08

## 2021-06-29 RX ADMIN — INSULIN LISPRO 4 UNITS: 100 INJECTION, SOLUTION INTRAVENOUS; SUBCUTANEOUS at 08:05

## 2021-06-29 RX ADMIN — METHYLPREDNISOLONE SODIUM SUCCINATE 80 MG: 125 INJECTION, POWDER, FOR SOLUTION INTRAMUSCULAR; INTRAVENOUS at 15:11

## 2021-06-29 RX ADMIN — CLONAZEPAM 0.5 MG: 0.5 TABLET ORAL at 15:11

## 2021-06-30 ENCOUNTER — APPOINTMENT (OUTPATIENT)
Dept: CARDIOLOGY | Facility: HOSPITAL | Age: 36
End: 2021-06-30

## 2021-06-30 VITALS
HEART RATE: 86 BPM | WEIGHT: 293 LBS | TEMPERATURE: 97.9 F | HEIGHT: 66 IN | BODY MASS INDEX: 47.09 KG/M2 | RESPIRATION RATE: 18 BRPM | DIASTOLIC BLOOD PRESSURE: 55 MMHG | OXYGEN SATURATION: 96 % | SYSTOLIC BLOOD PRESSURE: 117 MMHG

## 2021-06-30 LAB
GLUCOSE BLDC GLUCOMTR-MCNC: 374 MG/DL (ref 70–130)
GLUCOSE BLDC GLUCOMTR-MCNC: 382 MG/DL (ref 70–130)
GLUCOSE BLDC GLUCOMTR-MCNC: 485 MG/DL (ref 70–130)

## 2021-06-30 PROCEDURE — 63710000001 INSULIN LISPRO (HUMAN) PER 5 UNITS: Performed by: NURSE PRACTITIONER

## 2021-06-30 PROCEDURE — 93246 EXT ECG>7D<15D RECORDING: CPT

## 2021-06-30 PROCEDURE — 63710000001 INSULIN LISPRO (HUMAN) PER 5 UNITS: Performed by: HOSPITALIST

## 2021-06-30 PROCEDURE — G0378 HOSPITAL OBSERVATION PER HR: HCPCS

## 2021-06-30 PROCEDURE — 82962 GLUCOSE BLOOD TEST: CPT

## 2021-06-30 PROCEDURE — 63710000001 INSULIN LISPRO (HUMAN) PER 5 UNITS: Performed by: INTERNAL MEDICINE

## 2021-06-30 PROCEDURE — 99214 OFFICE O/P EST MOD 30 MIN: CPT | Performed by: INTERNAL MEDICINE

## 2021-06-30 RX ORDER — INSULIN GLARGINE 100 [IU]/ML
10 INJECTION, SOLUTION SUBCUTANEOUS NIGHTLY
Status: DISCONTINUED | OUTPATIENT
Start: 2021-06-30 | End: 2021-06-30 | Stop reason: HOSPADM

## 2021-06-30 RX ORDER — INSULIN LISPRO 100 [IU]/ML
0-14 INJECTION, SOLUTION INTRAVENOUS; SUBCUTANEOUS
Status: DISCONTINUED | OUTPATIENT
Start: 2021-06-30 | End: 2021-06-30 | Stop reason: HOSPADM

## 2021-06-30 RX ORDER — INSULIN LISPRO 100 [IU]/ML
8 INJECTION, SOLUTION INTRAVENOUS; SUBCUTANEOUS ONCE
Status: COMPLETED | OUTPATIENT
Start: 2021-06-30 | End: 2021-06-30

## 2021-06-30 RX ORDER — CLONAZEPAM 0.5 MG/1
0.5 TABLET ORAL 2 TIMES DAILY PRN
Qty: 10 TABLET | Refills: 0 | Status: SHIPPED | OUTPATIENT
Start: 2021-06-30

## 2021-06-30 RX ADMIN — CLONAZEPAM 0.5 MG: 0.5 TABLET ORAL at 09:58

## 2021-06-30 RX ADMIN — INSULIN LISPRO 8 UNITS: 100 INJECTION, SOLUTION INTRAVENOUS; SUBCUTANEOUS at 00:40

## 2021-06-30 RX ADMIN — INSULIN LISPRO 12 UNITS: 100 INJECTION, SOLUTION INTRAVENOUS; SUBCUTANEOUS at 12:11

## 2021-06-30 RX ADMIN — CARVEDILOL 25 MG: 25 TABLET, FILM COATED ORAL at 09:57

## 2021-06-30 RX ADMIN — AZELASTINE HYDROCHLORIDE 2 SPRAY: 137 SPRAY, METERED NASAL at 09:59

## 2021-06-30 RX ADMIN — SODIUM CHLORIDE, PRESERVATIVE FREE 10 ML: 5 INJECTION INTRAVENOUS at 10:00

## 2021-06-30 RX ADMIN — SPIRONOLACTONE 25 MG: 25 TABLET ORAL at 09:57

## 2021-06-30 RX ADMIN — METOLAZONE 5 MG: 5 TABLET ORAL at 09:58

## 2021-06-30 RX ADMIN — GABAPENTIN 300 MG: 300 CAPSULE ORAL at 09:57

## 2021-06-30 RX ADMIN — LISINOPRIL 40 MG: 40 TABLET ORAL at 09:57

## 2021-06-30 RX ADMIN — DULOXETINE HYDROCHLORIDE 30 MG: 30 CAPSULE, DELAYED RELEASE ORAL at 09:57

## 2021-06-30 RX ADMIN — INSULIN LISPRO 9 UNITS: 100 INJECTION, SOLUTION INTRAVENOUS; SUBCUTANEOUS at 07:02

## 2021-07-01 ENCOUNTER — READMISSION MANAGEMENT (OUTPATIENT)
Dept: CALL CENTER | Facility: HOSPITAL | Age: 36
End: 2021-07-01

## 2021-07-01 NOTE — OUTREACH NOTE
Prep Survey      Responses   [facility] [place] facility patient discharged from?  [place]   Is LACE score < 7 ?  No   Emergency Room discharge w/ pulse ox?  No   Eligibility  Readm Mgmt   Discharge diagnosis  Near syncope   Does the patient have one of the following disease processes/diagnoses(primary or secondary)?  Other   Does the patient have Home health ordered?  No   Is there a DME ordered?  No   Medication alerts for this patient  Klonopin   Prep survey completed?  Yes          Angeline Ritchie RN

## 2021-07-04 ENCOUNTER — READMISSION MANAGEMENT (OUTPATIENT)
Dept: CALL CENTER | Facility: HOSPITAL | Age: 36
End: 2021-07-04

## 2021-07-04 NOTE — OUTREACH NOTE
Medical Week 1 Survey      Responses   [facility] patient discharged from?  [place]   Does the patient have one of the following disease processes/diagnoses(primary or secondary)?  Other   Week 1 attempt successful?  No   Unsuccessful attempts  Attempt 1          Shannan Freire RN

## 2021-07-06 ENCOUNTER — READMISSION MANAGEMENT (OUTPATIENT)
Dept: CALL CENTER | Facility: HOSPITAL | Age: 36
End: 2021-07-06

## 2021-07-06 NOTE — OUTREACH NOTE
Medical Week 1 Survey      Responses   [facility]   Does the patient have one of the following disease processes/diagnoses(primary or secondary)?  Other   Week 1 attempt successful?  Yes   Call start time  1030   Call end time  1055   Discharge diagnosis  Near syncope   Is patient permission given to speak with other caregiver?  No   Medication alerts for this patient  Klonopin   Meds reviewed with patient/caregiver?  Yes   Is the patient having any side effects they believe may be caused by any medication additions or changes?  No   Does the patient have all medications ordered at discharge?  Yes   Is the patient taking all medications as directed (includes completed medication regime)?  No   What is preventing the patient from taking all medications as directed?  Side effects [Metrformin makes her have nausea ]   Medication comments  She stopped taking Metformin- BS was 458.  She did take it yesterday. The Metformin makes her nausea ans vomiting.    Does the patient have a primary care provider?   Yes   Does the patient have an appointment with their PCP within 7 days of discharge?  No   What is preventing the patient from scheduling follow up appointments within 7 days of discharge?  Unsure of when or with whom, Haven't had time   Nursing Interventions  Educated patient on importance of making appointment, Advised patient to make appointment [She has tryed to call her PCP today]   Comments  She has a call into the PCP    Has home health visited the patient within 72 hours of discharge?  N/A   Psychosocial issues?  No   Did the patient receive a copy of their discharge instructions?  Yes   Nursing interventions  Reviewed instructions with patient   What is the patient's perception of their health status since discharge?  Worsening [She does not like how the Metformin makes her feel and is not taking it as prescribed, Her BS was 458 yesterday, she did take the medication  and her BS went down. She is going to call her PCP today. ]   Is the patient/caregiver able to teach back signs and symptoms related to disease process for when to call PCP?  Yes   Is the patient/caregiver able to teach back signs and symptoms related to disease process for when to call 911?  Yes   Is the patient/caregiver able to teach back the hierarchy of who to call/visit for symptoms/problems? PCP, Specialist, Home health nurse, Urgent Care, ED, 911  Yes   Week 1 call completed?  Yes   Wrap up additional comments  Advised to take medicaton as prescribed and call PCP today.           Leyla Charles RN

## 2021-07-14 ENCOUNTER — READMISSION MANAGEMENT (OUTPATIENT)
Dept: CALL CENTER | Facility: HOSPITAL | Age: 36
End: 2021-07-14

## 2021-07-14 NOTE — OUTREACH NOTE
Medical Week 2 Survey      Responses   [facility] patient discharged from?  [place]   Does the patient have one of the following disease processes/diagnoses(primary or secondary)?  Other   Week 2 attempt successful?  No   Unsuccessful attempts  Attempt 1          Argelia Lopez RN

## 2021-07-19 ENCOUNTER — READMISSION MANAGEMENT (OUTPATIENT)
Dept: CALL CENTER | Facility: HOSPITAL | Age: 36
End: 2021-07-19

## 2021-07-19 NOTE — OUTREACH NOTE
Medical Week 2 Survey      Responses   [facility] patient discharged from?  [place]   Does the patient have one of the following disease processes/diagnoses(primary or secondary)?  Other   Week 2 attempt successful?  No   Unsuccessful attempts  Attempt 2          Argelia Lopez RN

## 2021-07-28 ENCOUNTER — READMISSION MANAGEMENT (OUTPATIENT)
Dept: CALL CENTER | Facility: HOSPITAL | Age: 36
End: 2021-07-28

## 2021-07-28 NOTE — OUTREACH NOTE
Medical Week 3 Survey      Responses   [facility] patient discharged from?  [place]   Does the patient have one of the following disease processes/diagnoses(primary or secondary)?  Other   Week 3 attempt successful?  Yes   Call start time  1631   Call end time  1633   Discharge diagnosis  Near syncope   Person spoke with today (if not patient) and relationship  Radha-sister    Meds reviewed with patient/caregiver?  Yes   Is the patient taking all medications as directed (includes completed medication regime)?  Yes   Has the patient kept scheduled appointments due by today?  Yes   What is the patient's perception of their health status since discharge?  Improving [Sister reports her BS's are still high]   If the patient is a current smoker, are they able to teach back resources for cessation?  -- [smoker]   Week 3 Call Completed?  Yes          Gwen Farah RN

## 2021-08-07 LAB
MAXIMAL PREDICTED HEART RATE: 183 BPM
STRESS TARGET HR: 156 BPM

## 2021-08-07 PROCEDURE — 93248 EXT ECG>7D<15D REV&INTERPJ: CPT | Performed by: INTERNAL MEDICINE

## 2025-02-16 ENCOUNTER — HOSPITAL ENCOUNTER (EMERGENCY)
Facility: HOSPITAL | Age: 40
Discharge: HOME | End: 2025-02-16
Attending: EMERGENCY MEDICINE | Admitting: EMERGENCY MEDICINE
Payer: COMMERCIAL

## 2025-02-16 ENCOUNTER — APPOINTMENT (OUTPATIENT)
Dept: CT IMAGING | Facility: HOSPITAL | Age: 40
End: 2025-02-16
Payer: COMMERCIAL

## 2025-02-16 VITALS
SYSTOLIC BLOOD PRESSURE: 206 MMHG | WEIGHT: 293 LBS | DIASTOLIC BLOOD PRESSURE: 112 MMHG | RESPIRATION RATE: 20 BRPM | HEIGHT: 67 IN | OXYGEN SATURATION: 97 % | BODY MASS INDEX: 45.99 KG/M2 | HEART RATE: 106 BPM | TEMPERATURE: 97.2 F

## 2025-02-16 DIAGNOSIS — M54.41 ACUTE RIGHT-SIDED LOW BACK PAIN WITH RIGHT-SIDED SCIATICA: ICD-10-CM

## 2025-02-16 DIAGNOSIS — M51.26 HERNIATED LUMBAR INTERVERTEBRAL DISC: Primary | ICD-10-CM

## 2025-02-16 PROCEDURE — 99284 EMERGENCY DEPT VISIT MOD MDM: CPT

## 2025-02-16 PROCEDURE — 72131 CT LUMBAR SPINE W/O DYE: CPT

## 2025-02-16 PROCEDURE — 25010000002 METHYLPREDNISOLONE PER 80 MG: Performed by: EMERGENCY MEDICINE

## 2025-02-16 PROCEDURE — 25010000002 HYDROMORPHONE 1 MG/ML SOLUTION: Performed by: EMERGENCY MEDICINE

## 2025-02-16 PROCEDURE — 96372 THER/PROPH/DIAG INJ SC/IM: CPT

## 2025-02-16 RX ORDER — HYDROCODONE BITARTRATE AND ACETAMINOPHEN 7.5; 325 MG/1; MG/1
1 TABLET ORAL ONCE
Status: COMPLETED | OUTPATIENT
Start: 2025-02-16 | End: 2025-02-16

## 2025-02-16 RX ORDER — METHYLPREDNISOLONE ACETATE 80 MG/ML
80 INJECTION, SUSPENSION INTRA-ARTICULAR; INTRALESIONAL; INTRAMUSCULAR; SOFT TISSUE ONCE
Status: COMPLETED | OUTPATIENT
Start: 2025-02-16 | End: 2025-02-16

## 2025-02-16 RX ORDER — METHOCARBAMOL 750 MG/1
750 TABLET, FILM COATED ORAL 3 TIMES DAILY PRN
Qty: 30 TABLET | Refills: 0 | Status: SHIPPED | OUTPATIENT
Start: 2025-02-16

## 2025-02-16 RX ORDER — METHYLPREDNISOLONE 4 MG/1
TABLET ORAL
Qty: 21 TABLET | Refills: 0 | Status: SHIPPED | OUTPATIENT
Start: 2025-02-16

## 2025-02-16 RX ADMIN — HYDROMORPHONE HYDROCHLORIDE 1 MG: 1 INJECTION, SOLUTION INTRAMUSCULAR; INTRAVENOUS; SUBCUTANEOUS at 21:23

## 2025-02-16 RX ADMIN — METHYLPREDNISOLONE ACETATE 80 MG: 80 INJECTION, SUSPENSION INTRA-ARTICULAR; INTRALESIONAL; INTRAMUSCULAR; SOFT TISSUE at 21:23

## 2025-02-16 RX ADMIN — HYDROCODONE BITARTRATE AND ACETAMINOPHEN 1 TABLET: 7.5; 325 TABLET ORAL at 19:56

## 2025-02-16 NOTE — ED TRIAGE NOTES
Pt reports back pain that started a week ago, may be related to helping her mom (who is Flu+) up and down off the toilet

## 2025-02-17 NOTE — ED PROVIDER NOTES
MD ATTESTATION NOTE     SHARED VISIT: This visit was performed by BOTH a physician and an APC. The substantive portion of the medical decision making was performed by this attesting physician who made or approved the management plan and takes responsibility for patient management. All studies in the APC note (if performed) were independently interpreted by me.  The MARIA ESTHER and I have discussed this patient's history, physical exam, and treatment plan. I have reviewed the documentation and personally had a face to face interaction with the patient. I affirm the documentation and agree with the treatment and plan. I provided a substantive portion of the care of the patient.  I personally performed the physical exam in its entirety, and below are my findings.      Brief HPI: Patient complains of acute low back pain.  Pain began about a week ago after she was lifting her mother, who was sick with the flu.  Pain is in the right lower back and radiates into the right buttock and right thigh.  She denies loss of bowel/bladder control, saddle anesthesia, fever, or leg weakness.    PHYSICAL EXAM    GENERAL: Awake, alert, oriented x 3.  Well-developed, well-nourished female.  Resting comfortably in no acute distress.  BMI 56.4  HENT: nares patent  EYES: no scleral icterus  CV: regular rhythm, normal rate  RESPIRATORY: normal effort, CTAB  ABDOMEN: soft, nontender  MUSCULOSKELETAL: There is tenderness over the right lumbar paraspinal muscles  NEURO: No saddle anesthesia straight leg raise negative.  Normal/equal strength with bilateral hip flexion/extension, knee flexion/extension, ankle plantarflexion/dorsiflexion, and extension of the great toes.  Normal light touch sensation in both lower extremities.  PSYCH:  calm, cooperative  SKIN: warm, dry    Vital signs and nursing notes reviewed.        Plan: Patient complains of acute back pain that radiates into the right buttock/leg.  Neuroexam is nonfocal.  CT of the lumbar spine  shows multilevel degenerative changes.  There is anterolisthesis of L4 on L5 with a disc bulge at this level.  There appears to be moderate right foraminal narrowing.  She has been given Norco for pain.  Patient gets monthly prescriptions of pain medication.  Plan is for symptomatic treatment with a Medrol Dosepak and muscle relaxers.    CT lumbar spine personally interpreted by me.  My personal interpretation is: Normal alignment.  No acute fracture    ED Course as of 02/16/25 2111   Sun Feb 16, 2025 1920 [facility] -patient reports right lower back pain radiating to her right thigh that started 2 weeks ago after having to lift her mother several times.  It is constant, worse with range of motion.  Has previously had back trouble and seen pain management that is not currently in any pain management, states that the leg pain is new.  No saddle paresthesias or bowel or bladder dysfunction.  Will perform CT lumbar spine, Norco ordered for pain control.  Repeat blood pressure 170/100, history of hypertension, took BP meds this morning. [KA]   2034 Assessed patient.  She is still having significant pain.  Will order IM Dilaudid and IM Depo-Medrol.  Offered admission to observation unit for MRI and neurosurgery consultation.  Patient states she has 2 children at home and is unable to stay for further evaluation.  Will discharge with steroids, muscle relaxer, pain medication.  Will place referral to neurosurgery for follow-up.  Discussed ED return precautions with the patient. [MP]   2034 CT lumbar spine independently interpreted by me as no compression fracture [MP]      ED Course User Index  [KA] Siobhan Pearson PA-C  [MP] Berna Craft PA-C KASPER reviewed by Ernie Serna MD Holland, William D, MD  02/16/25 2111

## 2025-02-17 NOTE — ED PROVIDER NOTES
EMERGENCY DEPARTMENT ENCOUNTER  Room Number:  06/06  PCP: Pamela Pérez APRN  Independent Historians: Patient      HPI:  Chief Complaint: had concerns including Back Pain.       A complete HPI/ROS/PMH/PSH/SH/FH are unobtainable due to: None    Chronic or social conditions impacting patient care (Social Determinants of Health): None      Context: Angelica Whiteside is a 39 y.o. female with a medical history of morbid obesity, hypertension, asthma, MICHAEL, diabetes, CHF who presents to the ED c/o acute back pain.  Patient reports for the last week she has been helping to care for her mother who was sick with the flu.  Has been lifting her to the toilet and back.  She complains of pain in the right side of her low back that radiates into the right thigh.  She has been taking Norco at home without improvement.  Denies bowel/bladder incontinence or saddle anesthesia.  No numbness or weakness of the lower extremities.  No fall or blunt back trauma.  No other systemic complaints at this time.      Review of prior external notes (non-ED) -and- Review of prior external test results outside of this encounter:  Patient seen in office by cardiology on 8/5/2024 for CHF.  Reviewed assessment and plan.  Will prescribe additional week of increased Lasix.  Reviewed labs collected on 1/19/2025.  CBC with hemoglobin 8.7, CMP with creatinine 0.64.    Prescription drug monitoring program review: [facility] reviewed by Ernie Serna MD   N/A    PAST MEDICAL HISTORY  Active Ambulatory Problems     Diagnosis Date Noted    Near syncope 06/27/2021    Morbid obesity     Hypertension     Cardiomyopathy     Asthma     Sleep apnea     Type 2 diabetes mellitus     Vertigo 06/29/2021     Resolved Ambulatory Problems     Diagnosis Date Noted    No Resolved Ambulatory Problems     Past Medical History:   Diagnosis Date    CHF (congestive heart failure)          PAST SURGICAL HISTORY  Past Surgical History:   Procedure Laterality Date      SECTION           FAMILY HISTORY  No family history on file.      SOCIAL HISTORY  Social History     Socioeconomic History    Marital status: Single   Tobacco Use    Smoking status: Never    Smokeless tobacco: Never   Vaping Use    Vaping status: Never Used   Substance and Sexual Activity    Alcohol use: Never    Drug use: Never         ALLERGIES  Patient has no known allergies.      REVIEW OF SYSTEMS  Included in HPI  All systems reviewed and negative except for those discussed in HPI.      PHYSICAL EXAM    I have reviewed the triage vital signs and nursing notes.    ED Triage Vitals [25 1851]   Temp Heart Rate Resp BP SpO2   97.2 °F (36.2 °C) 106 20 (!) 206/112 97 %      Temp src Heart Rate Source Patient Position BP Location FiO2 (%)   -- -- -- -- --       Physical Exam  Constitutional:       General: She is not in acute distress.     Appearance: She is well-developed. She is morbidly obese.   HENT:      Head: Normocephalic and atraumatic.   Eyes:      Extraocular Movements: Extraocular movements intact.   Cardiovascular:      Rate and Rhythm: Normal rate and regular rhythm.      Pulses:           Dorsalis pedis pulses are 2+ on the right side and 2+ on the left side.      Heart sounds: Normal heart sounds.   Pulmonary:      Effort: Pulmonary effort is normal.      Breath sounds: Normal breath sounds.   Abdominal:      General: There is no distension.   Musculoskeletal:      Comments: No midline lumbar spine tenderness.  Patient has tenderness over right lower lumbar spine.   Skin:     General: Skin is warm.   Neurological:      General: No focal deficit present.      Mental Status: She is alert and oriented to person, place, and time.      Comments: SILT BLE. Motor strength 5/5 BLE   Psychiatric:         Mood and Affect: Mood normal.             RADIOLOGY  CT Lumbar Spine Without Contrast    Result Date: 2025  CT LUMBAR SPINE WITHOUT CONTRAST  HISTORY: Back pain that began a week ago and  radiates to the right leg.  TECHNIQUE:  CT includes lumbar spine imaging and data reconstructed in coronal and sagittal planes with field-of-view for lower T12 to the upper sacrum.  COMPARISON: None  FINDINGS:. Vertebral body and disc space heights appear within normal limits. Body type and streak artifact limits evaluation for disc herniation and nonosseous canal or foraminal stenosis.  At L1-2 the central canal and neural foramina appear patent.  At L2-3, there appears to be a disc bulge with mild narrowing of the central canal and without evidence for foraminal narrowing.  At L3-4, there is moderate right and mild left facet arthritis and there appears to be a mild disc bulge without evidence for canal or foraminal narrowing.  At L4-5, there is moderate bilateral facet arthritis with 3 mm anterolisthesis of L4 on L5. Uncovered disc bulge is present and there is diminished foraminal height with moderate right greater than left foraminal narrowing. Disc bulge and potential small protrusion extends into the inferior aspect of the right neuroforamen, potentially contacting the undersurface of the exiting right L4 nerve. The central canal appears to be patent.  At L5-S1, there is minor endplate overgrowth without evidence for foraminal narrowing.      1. Facet arthritis in the lumbar spine appears greatest at L4-5. There is mild anterolisthesis of L4 with respect to L5 and there is uncovered disc bulge at this level and there appears to be moderate right greater than left foraminal narrowing, disc bulge or potential protrusion appears to extend to the inferior aspect of the right neural foramen, potentially contacting the undersurface of the exiting right L4 nerve. At L2-3, there appears to be a disc bulge with mild narrowing of the central canal. 2. Evaluation for disc herniation and canal narrowing is limited with CT and MRI would be the best means to further evaluate.  Radiation dose reduction techniques were  utilized, including automated exposure control and exposure modulation based on body size.           MEDICATIONS GIVEN IN ER  Medications   HYDROcodone-acetaminophen (NORCO) 7.5-325 MG per tablet 1 tablet (1 tablet Oral Given 2/16/25 1956)   HYDROmorphone (DILAUDID) injection 1 mg (1 mg Intramuscular Given 2/16/25 2123)   methylPREDNISolone acetate (DEPO-medrol) injection 80 mg (80 mg Intramuscular Given 2/16/25 2123)         ORDERS PLACED DURING THIS VISIT:  Orders Placed This Encounter   Procedures    CT Lumbar Spine Without Contrast    Ambulatory Referral to Neurosurgery         OUTPATIENT MEDICATION MANAGEMENT:  No current Epic-ordered facility-administered medications on file.     Current Outpatient Medications Ordered in Epic   Medication Sig Dispense Refill    azelastine (ASTELIN) 0.1 % nasal spray 2 sprays into the nostril(s) as directed by provider 2 (Two) Times a Day.      carvedilol (COREG) 25 MG tablet Take 25 mg by mouth 2 (Two) Times a Day With Meals.      clonazePAM (KlonoPIN) 0.5 MG tablet Take 1 tablet by mouth 2 (Two) Times a Day As Needed (vertigo). 10 tablet 0    DULoxetine (CYMBALTA) 30 MG capsule Take 30 mg by mouth Daily.      EPINEPHrine (EPIPEN) 0.3 MG/0.3ML solution auto-injector injection Inject 0.3 mg into the appropriate muscle as directed by prescriber 1 (One) Time As Needed.      furosemide (LASIX) 80 MG tablet Take 80 mg by mouth Daily.      gabapentin (NEURONTIN) 300 MG capsule Take 300 mg by mouth 3 (Three) Times a Day.      HYDROcodone-acetaminophen (NORCO) 7.5-325 MG per tablet Take 1 tablet by mouth Every 8 (Eight) Hours As Needed for Moderate Pain .      hydrOXYzine (ATARAX) 10 MG tablet Take 10 mg by mouth 3 (Three) Times a Day As Needed for Anxiety.      lisinopril (PRINIVIL,ZESTRIL) 40 MG tablet Take 40 mg by mouth Daily.      metFORMIN (GLUCOPHAGE) 850 MG tablet Take 1 tablet by mouth 2 (Two) Times a Day With Meals. 60 tablet 0    methocarbamol (ROBAXIN) 750 MG tablet Take 1  tablet by mouth 3 (Three) Times a Day As Needed for Muscle Spasms. 30 tablet 0    methylPREDNISolone (MEDROL) 4 MG dose pack Take as directed on package instructions. 21 tablet 0    metOLazone (ZAROXOLYN) 2.5 MG tablet Take 5 mg by mouth Daily.      spironolactone (ALDACTONE) 25 MG tablet Take 25 mg by mouth Daily.      tiZANidine (ZANAFLEX) 2 MG half tablet Take 2 mg by mouth At Night As Needed for Muscle Spasms.      vitamin D (ERGOCALCIFEROL) 1.25 MG (05670 UT) capsule capsule Take 50,000 Units by mouth 1 (One) Time Per Week.               PROGRESS, DATA ANALYSIS, CONSULTS, AND MEDICAL DECISION MAKING  All labs have been independently interpreted by me.  All radiology studies have been reviewed by me. All EKG's have been independently viewed and interpreted by me.  Discussion below represents my analysis of pertinent findings related to patient's condition, differential diagnosis, treatment plan and final disposition.    Differential diagnosis includes but is not limited to herniated disc, vertebral compression fracture, muscle strain.        ED Course as of 02/17/25 0003   Sun Feb 16, 2025 1920 First Look -patient reports right lower back pain radiating to her right thigh that started 2 weeks ago after having to lift her mother several times.  It is constant, worse with range of motion.  Has previously had back trouble and seen pain management that is not currently in any pain management, states that the leg pain is new.  No saddle paresthesias or bowel or bladder dysfunction.  Will perform CT lumbar spine, Norco ordered for pain control.  Repeat blood pressure 170/100, history of hypertension, took BP meds this morning. [KA]   2034 Assessed patient.  She is still having significant pain.  Will order IM Dilaudid and IM Depo-Medrol.  Offered admission to observation unit for MRI and neurosurgery consultation.  Patient states she has 2 children at home and is unable to stay for further evaluation.  Will  discharge with steroids, muscle relaxer, pain medication.  Will place referral to neurosurgery for follow-up.  Discussed ED return precautions with the patient. [MP]   2034 CT lumbar spine independently interpreted by me as no compression fracture [MP]      ED Course User Index  [KA] Siobhan Pearson PA-C  [MP] Berna Craft PA-C             AS OF 00:03 EST VITALS:    BP - (!) 206/112  HR - 106  TEMP - 97.2 °F (36.2 °C)  O2 SATS - 97%    COMPLEXITY OF CARE  Admission was considered but after careful review of the patient's presentation, physical examination, diagnostic results, and response to treatment the patient may be safely discharged with outpatient follow-up.      DIAGNOSIS  Final diagnoses:   Herniated lumbar intervertebral disc   Acute right-sided low back pain with right-sided sciatica         DISPOSITION  ED Disposition       ED Disposition   Discharge    Condition   Stable    Comment   --                Please note that portions of this document were completed with a voice recognition program.    Note Disclaimer: At [facility], we believe that sharing information builds trust and better relationships. You are receiving this note because you recently visited [facility]. It is possible you will see health information before a provider has talked with you about it. This kind of information can be easy to misunderstand. To help you fully understand what it means for your health, we urge you to discuss this note with your provider.     Berna Craft PA-C  02/17/25 0003